# Patient Record
Sex: MALE | Race: WHITE | NOT HISPANIC OR LATINO | Employment: STUDENT | URBAN - METROPOLITAN AREA
[De-identification: names, ages, dates, MRNs, and addresses within clinical notes are randomized per-mention and may not be internally consistent; named-entity substitution may affect disease eponyms.]

---

## 2025-05-10 ENCOUNTER — OFFICE VISIT (OUTPATIENT)
Dept: URGENT CARE | Age: 20
End: 2025-05-10
Payer: COMMERCIAL

## 2025-05-10 VITALS
SYSTOLIC BLOOD PRESSURE: 133 MMHG | DIASTOLIC BLOOD PRESSURE: 79 MMHG | OXYGEN SATURATION: 98 % | RESPIRATION RATE: 18 BRPM | TEMPERATURE: 97.4 F | WEIGHT: 175 LBS | HEART RATE: 75 BPM

## 2025-05-10 DIAGNOSIS — L01.00 IMPETIGO: Primary | ICD-10-CM

## 2025-05-10 RX ORDER — MUPIROCIN 20 MG/G
OINTMENT TOPICAL
Qty: 22 G | Refills: 0 | Status: SHIPPED | OUTPATIENT
Start: 2025-05-10 | End: 2025-05-20

## 2025-05-10 NOTE — PROGRESS NOTES
Subjective   Patient ID: Bhupendra Faith is a 19 y.o. male. They present today with a chief complaint of Tinea (Noticed today on the back of his neck).    History of Present Illness  Patient is a 19-year-old male who presents for skin lesion on the back of his neck.  States that he noticed it earlier today after getting a haircut.  States that he was concerned that it may be ringworm.  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    Past Medical History  Allergies as of 05/10/2025    (No Known Allergies)       Prescriptions Prior to Admission[1]     Medical History[2]    Surgical History[3]     reports that he has never smoked. He has never used smokeless tobacco. He reports that he does not drink alcohol.    Review of Systems  ROS is negative unless otherwise stated in HPI.         Objective    Vitals:    05/10/25 1704   BP: 133/79   BP Location: Left arm   Patient Position: Sitting   BP Cuff Size: Adult   Pulse: 75   Resp: 18   Temp: 36.3 °C (97.4 °F)   TempSrc: Temporal   SpO2: 98%   Weight: 79.4 kg (175 lb)     No LMP for male patient.      VS: As documented in the triage note and EMR flowsheet from this visit was reviewed  General: Well appearing. No acute distress.   Eyes:  Extraocular movements grossly intact. No scleral icterus.   Head: Atraumatic. Normocephalic.     Neck: No meningismus. No gross masses. Full movement through range of motion  CV: Regular rhythm. No murmurs, rubs, gallops appreciated.   Resp: Clear to auscultation bilaterally. No respiratory distress.    Skin: Warm, dry. 3 lesions to the right posterior neck and ear that are 1cm or less with an erythematous base and overlying gold crusting.   Neuro: CN II-VII intact. A&O x3. Speech fluent. Alert. Moving all extremities. Ambulates with normal gait  Psych: Appropriate mood and affect for situation      Point of Care Test & Imaging Results from this visit  No results found for this visit on 05/10/25.   Imaging  No results found.    Cardiology, Vascular, and  Other Imaging  No other imaging results found for the past 2 days      Diagnostic study results (if any) were reviewed by Darleen De La Rosa PA-C.    Assessment/Plan   Allergies, medications, history, and pertinent labs/EKGs/Imaging reviewed by Darleen De La Rosa PA-C.     Medical Decision Making  Patient is a 19-year-old male who presents for concern of rash to the posterior neck. On examination, patient has 3 erythematous lesions with overlying gold colored crusting to the posterior neck and right ear.  Areas are a centimeter or less in size.  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  Patient will be prescribed mupirocin ointment for concern of impetigo.  Advised that this is contagious and to refrain from wrestling until healed.    Orders and Diagnoses  Diagnoses and all orders for this visit:  Impetigo  -     mupirocin (Bactroban) 2 % ointment; Apply topically 3 times a day for 10 days.      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

## 2025-05-15 ENCOUNTER — OFFICE VISIT (OUTPATIENT)
Dept: URGENT CARE | Age: 20
End: 2025-05-15
Payer: COMMERCIAL

## 2025-05-15 VITALS
DIASTOLIC BLOOD PRESSURE: 67 MMHG | SYSTOLIC BLOOD PRESSURE: 124 MMHG | HEART RATE: 83 BPM | RESPIRATION RATE: 20 BRPM | OXYGEN SATURATION: 98 % | TEMPERATURE: 98.8 F

## 2025-05-15 DIAGNOSIS — L01.00 IMPETIGO: Primary | ICD-10-CM

## 2025-05-15 DIAGNOSIS — J02.9 VIRAL PHARYNGITIS: ICD-10-CM

## 2025-05-15 RX ORDER — CEPHALEXIN 500 MG/1
500 CAPSULE ORAL 4 TIMES DAILY
Qty: 40 CAPSULE | Refills: 0 | Status: SHIPPED | OUTPATIENT
Start: 2025-05-15 | End: 2025-05-25

## 2025-05-15 NOTE — PROGRESS NOTES
Subjective   Patient ID: Bhupendra Faith is a 19 y.o. male. They present today with a chief complaint of Fever ( impentago rash getting worse and states he has developed a fever ).    History of Present Illness  Patient is a 19-year-old male who presents for worsening rash.  States that he was seen several days ago and at that time was diagnosed with impetigo.  He has been using an ointment however states that the rash continues to spread. States that it was initially on his neck and ear however notes that it is on his legs and abdomen.  He states that he has also developed a sore throat and feeling feverish over the past couple of days and was unsure if the 2 were related.    Past Medical History  Allergies as of 05/15/2025    (No Known Allergies)       Prescriptions Prior to Admission[1]     Medical History[2]    Surgical History[3]     reports that he has never smoked. He has never used smokeless tobacco. He reports that he does not drink alcohol.    Review of Systems  ROS is negative unless otherwise stated in HPI.       Objective    Vitals:    05/15/25 1342   BP: 124/67   BP Location: Left arm   Patient Position: Sitting   BP Cuff Size: Adult   Pulse: 83   Resp: 20   Temp: 37.1 °C (98.8 °F)   TempSrc: Oral   SpO2: 98%     No LMP for male patient.      VS: As documented in the triage note and EMR flowsheet from this visit was reviewed  General: Well appearing. No acute distress.   Eyes:  Extraocular movements grossly intact. No scleral icterus.   Head: Atraumatic. Normocephalic.     Neck: No meningismus. No gross masses. Full movement through range of motion  ENT: Posterior oropharynx shows no erythema, exudate or edema.  Uvula is midline without edema.  No stridor or trismus  CV: Regular rhythm. No murmurs, rubs, gallops appreciated.   Resp: Clear to auscultation bilaterally. No respiratory distress.    GI: Nontender. Soft. No masses. No rebound, rigidity or guarding.   MSK: Symmetric muscle bulk. No gross  step offs or deformities.  Skin: Warm, dry. No rashes  Neuro: CN II-VII intact. A&O x3. Speech fluent. Alert. Moving all extremities. Ambulates with normal gait  Psych: Appropriate mood and affect for situation      Point of Care Test & Imaging Results from this visit  No results found for this visit on 05/15/25.   Imaging  No results found.    Cardiology, Vascular, and Other Imaging  No other imaging results found for the past 2 days      Diagnostic study results (if any) were reviewed by Darleen De La Rosa PA-C.    Assessment/Plan   Allergies, medications, history, and pertinent labs/EKGs/Imaging reviewed by Darleen De La Rosa PA-C.     Medical Decision Making  Patient is a 19-year-old male who presents for rash, sore throat.  On examination, patient well-appearing.  Vitals are stable.  A posterior oropharynx shows no edema or exudate.  He was seen by me 5 days ago for impetigo.  He has been using mupirocin ointment however states that the rash has continued to spread to his abdomen and leg.  Will switch to Keflex for oral antibiotic treatment for impetigo.  Counseled that he likely has a viral pharyngitis as well. He has no findings of edema or exudate concerning for strep infection. No intraoral lesions.  He is afebrile. Patient informed of the diagnosis.  They are agreeable to the plan as discussed above.  Patient given the opportunity to ask questions.  All of the patient's questions were answered. Given precautions in which to seek attention in the emergency department. Discussed follow up with PCP or other appropriate clinician.      Orders and Diagnoses  Diagnoses and all orders for this visit:  Impetigo  -     cephalexin (Keflex) 500 mg capsule; Take 1 capsule (500 mg) by mouth 4 times a day for 10 days.  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 No pertinent surgical history.

## 2025-06-08 ENCOUNTER — OFFICE VISIT (OUTPATIENT)
Dept: URGENT CARE | Age: 20
End: 2025-06-08
Payer: COMMERCIAL

## 2025-06-08 VITALS
OXYGEN SATURATION: 98 % | SYSTOLIC BLOOD PRESSURE: 122 MMHG | RESPIRATION RATE: 20 BRPM | TEMPERATURE: 98.8 F | WEIGHT: 175 LBS | HEART RATE: 72 BPM | DIASTOLIC BLOOD PRESSURE: 83 MMHG

## 2025-06-08 DIAGNOSIS — L01.00 IMPETIGO: Primary | ICD-10-CM

## 2025-06-08 RX ORDER — DOXYCYCLINE 100 MG/1
100 CAPSULE ORAL 2 TIMES DAILY
Qty: 20 CAPSULE | Refills: 0 | Status: SHIPPED | OUTPATIENT
Start: 2025-06-08 | End: 2025-06-18

## 2025-06-08 NOTE — PATIENT INSTRUCTIONS
Start Doxycycline as directed follow-up with dermatology if symptoms persist Go to the emergency room with any worsening symptoms.

## 2025-06-08 NOTE — PROGRESS NOTES
Subjective   Patient ID: Bhupendra Faith is a 19 y.o. male. They present today with a chief complaint of Rash (Abdomen, right ear, left armpit and left ring finger for 5 days).    History of Present Illness  HPI    Past Medical History  Allergies as of 06/08/2025    (No Known Allergies)       Prescriptions Prior to Admission[1]     Medical History[2]    Surgical History[3]     reports that he has never smoked. He has never used smokeless tobacco. He reports that he does not drink alcohol.    Review of Systems  Review of Systems                               Objective    Vitals:    06/08/25 1731   BP: 122/83   BP Location: Left arm   Patient Position: Sitting   BP Cuff Size: Adult   Pulse: 72   Resp: 20   Temp: 37.1 °C (98.8 °F)   TempSrc: Temporal   SpO2: 98%   Weight: 79.4 kg (175 lb)     No LMP for male patient.    Physical Exam    Procedures    Point of Care Test & Imaging Results from this visit  No results found for this visit on 06/08/25.   Imaging  No results found.    Cardiology, Vascular, and Other Imaging  No other imaging results found for the past 2 days      Diagnostic study results (if any) were reviewed by CORI Brower.    Assessment/Plan   Allergies, medications, history, and pertinent labs/EKGs/Imaging reviewed by CORI Brower.     Medical Decision Making  ***    Orders and Diagnoses  There are no diagnoses linked to this encounter.    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